# Patient Record
Sex: MALE | Race: ASIAN | NOT HISPANIC OR LATINO
[De-identification: names, ages, dates, MRNs, and addresses within clinical notes are randomized per-mention and may not be internally consistent; named-entity substitution may affect disease eponyms.]

---

## 2017-06-14 ENCOUNTER — APPOINTMENT (OUTPATIENT)
Dept: UROLOGY | Facility: CLINIC | Age: 67
End: 2017-06-14

## 2017-06-20 ENCOUNTER — FORM ENCOUNTER (OUTPATIENT)
Age: 67
End: 2017-06-20

## 2017-06-21 ENCOUNTER — APPOINTMENT (OUTPATIENT)
Dept: ULTRASOUND IMAGING | Facility: IMAGING CENTER | Age: 67
End: 2017-06-21

## 2017-06-21 ENCOUNTER — OUTPATIENT (OUTPATIENT)
Dept: OUTPATIENT SERVICES | Facility: HOSPITAL | Age: 67
LOS: 1 days | End: 2017-06-21
Payer: COMMERCIAL

## 2017-06-21 DIAGNOSIS — N20.0 CALCULUS OF KIDNEY: ICD-10-CM

## 2017-06-21 DIAGNOSIS — Z98.89 OTHER SPECIFIED POSTPROCEDURAL STATES: Chronic | ICD-10-CM

## 2017-06-21 PROCEDURE — 76770 US EXAM ABDO BACK WALL COMP: CPT

## 2017-08-17 ENCOUNTER — APPOINTMENT (OUTPATIENT)
Dept: ORTHOPEDIC SURGERY | Facility: CLINIC | Age: 67
End: 2017-08-17
Payer: MEDICARE

## 2017-08-17 VITALS — HEART RATE: 61 BPM | DIASTOLIC BLOOD PRESSURE: 80 MMHG | SYSTOLIC BLOOD PRESSURE: 125 MMHG

## 2017-08-17 VITALS — WEIGHT: 138 LBS | HEIGHT: 65 IN | BODY MASS INDEX: 22.99 KG/M2

## 2017-08-17 DIAGNOSIS — Z78.9 OTHER SPECIFIED HEALTH STATUS: ICD-10-CM

## 2017-08-17 DIAGNOSIS — Z86.39 PERSONAL HISTORY OF OTHER ENDOCRINE, NUTRITIONAL AND METABOLIC DISEASE: ICD-10-CM

## 2017-08-17 DIAGNOSIS — Z87.442 PERSONAL HISTORY OF URINARY CALCULI: ICD-10-CM

## 2017-08-17 PROCEDURE — 99214 OFFICE O/P EST MOD 30 MIN: CPT

## 2017-08-17 RX ORDER — ATORVASTATIN CALCIUM 80 MG/1
TABLET, FILM COATED ORAL
Refills: 0 | Status: ACTIVE | COMMUNITY

## 2017-08-31 ENCOUNTER — APPOINTMENT (OUTPATIENT)
Dept: ORTHOPEDIC SURGERY | Facility: CLINIC | Age: 67
End: 2017-08-31
Payer: MEDICARE

## 2017-08-31 PROCEDURE — 99214 OFFICE O/P EST MOD 30 MIN: CPT

## 2017-08-31 PROCEDURE — 99213 OFFICE O/P EST LOW 20 MIN: CPT

## 2018-07-22 PROBLEM — Z78.9 USES ALCOHOL OCCASIONALLY: Status: ACTIVE | Noted: 2017-08-17

## 2020-09-02 ENCOUNTER — APPOINTMENT (OUTPATIENT)
Dept: UROLOGY | Facility: CLINIC | Age: 70
End: 2020-09-02
Payer: MEDICARE

## 2020-09-02 VITALS — TEMPERATURE: 97.6 F

## 2020-09-02 VITALS
HEART RATE: 69 BPM | SYSTOLIC BLOOD PRESSURE: 118 MMHG | HEIGHT: 65 IN | RESPIRATION RATE: 16 BRPM | BODY MASS INDEX: 23.32 KG/M2 | WEIGHT: 140 LBS | DIASTOLIC BLOOD PRESSURE: 71 MMHG

## 2020-09-02 DIAGNOSIS — F17.200 NICOTINE DEPENDENCE, UNSPECIFIED, UNCOMPLICATED: ICD-10-CM

## 2020-09-02 LAB
BILIRUB UR QL STRIP: NEGATIVE
CLARITY UR: CLEAR
COLLECTION METHOD: NORMAL
GLUCOSE UR-MCNC: NEGATIVE
HCG UR QL: 0.2 EU/DL
HGB UR QL STRIP.AUTO: NEGATIVE
KETONES UR-MCNC: NEGATIVE
LEUKOCYTE ESTERASE UR QL STRIP: NEGATIVE
NITRITE UR QL STRIP: NEGATIVE
PH UR STRIP: 7
PROT UR STRIP-MCNC: NEGATIVE
SP GR UR STRIP: 1.02

## 2020-09-02 PROCEDURE — 99204 OFFICE O/P NEW MOD 45 MIN: CPT | Mod: 25

## 2020-09-02 PROCEDURE — 51798 US URINE CAPACITY MEASURE: CPT

## 2020-09-02 NOTE — REVIEW OF SYSTEMS
[Negative] : Heme/Lymph [Dry Eyes] : dryness of the eyes [History of kidney stones] : history of kidney stones [Joint Pain] : joint pain

## 2020-09-10 NOTE — HISTORY OF PRESENT ILLNESS
[Nocturia] : nocturia [Weak Stream] : weak stream [FreeTextEntry1] : 69 yr old male presents to re-establish care after 3 years. No follow up for kidney stones since last visit. Pt complaining of "slow urination during the nighttime", reports small voids overnight. Nocturia x 2-3. Pt denies dysuria, hematuria, or abd pain. Last seen > 3 years prior.\par \par HX- left URS for ureteral stone, cystolithalopaxy bladder stone treatment in 2/2016.\par Prior Stone analysis was calcium oxalate monohydrate 100%\par \par Pt notes  pain left lower back. \par \par PSA \par 4.61- 7/22/2020\par \par All pertinent parts of the patient PFSH (past medical, family, and social histories), laboratory, radiological studies and available physician notes were reviewed in chart and with pt. Questionnaire results, where appropriate, were discussed with the patient.\par  [Dysuria] : no dysuria [Hematuria - Gross] : no gross hematuria

## 2020-09-10 NOTE — PHYSICAL EXAM
[Heart Rate And Rhythm] : Heart rate and rhythm were normal [General Appearance - Well Developed] : well developed [] : no respiratory distress [Urethral Meatus] : meatus normal [Abdomen Soft] : soft [Penis Abnormality] : normal uncircumcised penis [Urinary Bladder Findings] : the bladder was normal on palpation [Scrotum] : the scrotum was normal [Rectal Exam - Rectum] : digital rectal exam was normal [Testes Mass (___cm)] : there were no testicular masses [No Prostate Nodules] : no prostate nodules [Prostate Size ___ (0-4)] : prostate size [unfilled] (scale: 0-4) [External Hemorrhoid] : an external hemorrhoid was present [Skin Color & Pigmentation] : normal skin color and pigmentation [No Focal Deficits] : no focal deficits [Oriented To Time, Place, And Person] : oriented to person, place, and time [Tender, Swollen] : that was nontender and non-swollen [FreeTextEntry1] : Left lower pain- paraspinal [No Palpable Adenopathy] : no palpable adenopathy

## 2020-09-10 NOTE — ASSESSMENT
[FreeTextEntry1] : PVR - 0 ml by bladder scan today\par \par Physical Exam - WNL \par \par urine dipstick- WNL\par Recent PSA noted, borderline for male his age.  Will reassess after starting BPH meds to decrease pressure, and plan as needed\par \par plan\par 1) Start Flomax \par 2)  RTC in 2 months with Renal US and repeat PSA\par

## 2020-11-18 ENCOUNTER — OUTPATIENT (OUTPATIENT)
Dept: OUTPATIENT SERVICES | Facility: HOSPITAL | Age: 70
LOS: 1 days | End: 2020-11-18
Payer: COMMERCIAL

## 2020-11-18 ENCOUNTER — APPOINTMENT (OUTPATIENT)
Dept: UROLOGY | Facility: CLINIC | Age: 70
End: 2020-11-18
Payer: MEDICARE

## 2020-11-18 VITALS — TEMPERATURE: 97.8 F

## 2020-11-18 DIAGNOSIS — Z98.89 OTHER SPECIFIED POSTPROCEDURAL STATES: Chronic | ICD-10-CM

## 2020-11-18 DIAGNOSIS — R35.0 FREQUENCY OF MICTURITION: ICD-10-CM

## 2020-11-18 PROCEDURE — 99213 OFFICE O/P EST LOW 20 MIN: CPT | Mod: 25

## 2020-11-18 PROCEDURE — 76775 US EXAM ABDO BACK WALL LIM: CPT | Mod: 26

## 2020-11-18 PROCEDURE — 76775 US EXAM ABDO BACK WALL LIM: CPT

## 2020-11-18 NOTE — HISTORY OF PRESENT ILLNESS
[FreeTextEntry1] : Pt is 71 yo male with h/o stones, BPH sx--now on flomax. Improved overall!  Frequency less including nocturia ~ 2/night), and improved urinary stream.\par \par Recent pSA 4.61 7/2020 on borderline/elevated for 70 yol male--> now on flomax- plan for repeat psa, and for renal US today [None] : no symptoms [Nocturia] : no nocturia [Weak Stream] : no weak stream

## 2020-11-18 NOTE — ASSESSMENT
[FreeTextEntry1] : Renal US today reviewed: shadowing ~ 4 mm left lower pole echogenic/stone, c/w unchanged stone since 2017 renal US. No hydro, no solid renal mass. Echogenic 2-3 mm without shadowing right lower pole, unclear if small stone.\par \par Overall, voiding sx improved with flomax- will cont\par \par 1. check psa today for repeat (was 4.61 9/2020)\par 2. RTC 6 months with renal US

## 2020-11-19 ENCOUNTER — NON-APPOINTMENT (OUTPATIENT)
Age: 70
End: 2020-11-19

## 2020-11-19 LAB — PSA SERPL-MCNC: 4.41 NG/ML

## 2020-11-27 DIAGNOSIS — N40.1 BENIGN PROSTATIC HYPERPLASIA WITH LOWER URINARY TRACT SYMPTOMS: ICD-10-CM

## 2020-11-27 DIAGNOSIS — N20.0 CALCULUS OF KIDNEY: ICD-10-CM

## 2020-12-17 ENCOUNTER — RX RENEWAL (OUTPATIENT)
Age: 70
End: 2020-12-17

## 2021-04-26 ENCOUNTER — RX RENEWAL (OUTPATIENT)
Age: 71
End: 2021-04-26

## 2021-05-19 ENCOUNTER — APPOINTMENT (OUTPATIENT)
Dept: UROLOGY | Facility: CLINIC | Age: 71
End: 2021-05-19
Payer: MEDICARE

## 2021-05-19 ENCOUNTER — OUTPATIENT (OUTPATIENT)
Dept: OUTPATIENT SERVICES | Facility: HOSPITAL | Age: 71
LOS: 1 days | End: 2021-05-19
Payer: COMMERCIAL

## 2021-05-19 DIAGNOSIS — N20.0 CALCULUS OF KIDNEY: ICD-10-CM

## 2021-05-19 DIAGNOSIS — R35.0 FREQUENCY OF MICTURITION: ICD-10-CM

## 2021-05-19 DIAGNOSIS — N21.0 CALCULUS IN BLADDER: ICD-10-CM

## 2021-05-19 DIAGNOSIS — Z98.89 OTHER SPECIFIED POSTPROCEDURAL STATES: Chronic | ICD-10-CM

## 2021-05-19 DIAGNOSIS — N40.1 BENIGN PROSTATIC HYPERPLASIA WITH LOWER URINARY TRACT SYMPTOMS: ICD-10-CM

## 2021-05-19 PROCEDURE — 76775 US EXAM ABDO BACK WALL LIM: CPT | Mod: 26

## 2021-05-19 PROCEDURE — 76775 US EXAM ABDO BACK WALL LIM: CPT

## 2021-05-19 PROCEDURE — 99213 OFFICE O/P EST LOW 20 MIN: CPT | Mod: 25

## 2021-05-19 NOTE — ASSESSMENT
[FreeTextEntry1] : Renal US today reviewed: shadowing ~ ~8 mm left lower pole echogenic/stone, possible cluster, larger than prior which were stable.No hydro, no solid renal mass. Echogenic 2-3 mm without shadowing right lower pole, unchanged likely small stone also right lower. 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

## 2021-05-19 NOTE — HISTORY OF PRESENT ILLNESS
[None] : None [FreeTextEntry1] : Pt is 71 yo male with h/o stones, BPH sx--now on flomax. Improved overall! Frequency less including nocturia ~ 2/night), and improved urinary stream.\par \par  pSA borderline/elevated for 70 yol male--> now on flomax- plan for repeat psa, and for renal US today \par  \par PSA \par 4.41 11/2020\par 4.61 7/2020 \par \par Reports some issues with erections; feels he's unable to achieve well intermittently, but now is 'okay'.\par \par No nocturia.\par \par S/p full exam 9/2020.

## 2021-05-19 NOTE — PHYSICAL EXAM
[General Appearance - Well Developed] : well developed [Skin Color & Pigmentation] : normal skin color and pigmentation [] : no respiratory distress [Edema] : no peripheral edema [Oriented To Time, Place, And Person] : oriented to person, place, and time [Normal Station and Gait] : the gait and station were normal for the patient's age [No Focal Deficits] : no focal deficits

## 2021-05-19 NOTE — LETTER BODY
[Dear  ___] : Dear  [unfilled], [Courtesy Letter:] : I had the pleasure of seeing your patient, [unfilled], in my office today. [Please see my note below.] : Please see my note below. [Consult Closing:] : Thank you very much for allowing me to participate in the care of this patient.  If you have any questions, please do not hesitate to contact me. [Sincerely,] : Sincerely, [FreeTextEntry1] : Pt is 69 yo male with h/o stones, BPH sx--now on flomax. Improved overall! Frequency less including nocturia ~ 2/night), and improved urinary stream.\par \par  pSA borderline/elevated for 70 yol male--> now on flomax- plan for repeat psa, and for renal US today \par  \par PSA \par 4.41 11/2020\par 4.61 7/2020 \par \par Reports some issues with erections; feels he's unable to achieve well intermittently, but now is 'okay'.\par \par No nocturia.\par \par S/p full exam 9/2020.\par \par Renal US today reviewed: shadowing ~ ~8 mm left lower pole echogenic/stone, possible cluster, larger than prior which were stable.No hydro, no solid renal mass. Echogenic 2-3 mm without shadowing right lower pole, unchanged likely small stone also right lower. 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

## 2021-05-30 ENCOUNTER — APPOINTMENT (OUTPATIENT)
Dept: UROLOGY | Facility: CLINIC | Age: 71
End: 2021-05-30
Payer: MEDICARE

## 2021-05-30 LAB — PSA SERPL-MCNC: 5.19 NG/ML

## 2021-05-30 PROCEDURE — 99214 OFFICE O/P EST MOD 30 MIN: CPT | Mod: 95

## 2021-05-30 NOTE — ASSESSMENT
[FreeTextEntry1] : I had long discussion today with patient about the psa, digital exam, and any imaging findings with respect to risks for prostate cancer.  We discussed the utility of prostate MRI as well as some of the new genetic markers in terms of the potential for improving diagnostic accuracy.\par \par With respect to prostate cancer, we also reviewed age and general health with respect to treatment and diagnostic options. We spoke about the risks of screening, as well as benefits.\par \par Options: f/u with psa to continue to trend, consider MRI of prostate.\par \par I recommended f/u psa, but pt and son prefer to obtain MRI and proceed based on that.\par \par He would like to proceed with: \par \par MRI and review\par \par \par \par

## 2021-05-30 NOTE — HISTORY OF PRESENT ILLNESS
[Home] : at home, [unfilled] , at the time of the visit. [Other Location: e.g. Home (Enter Location, City,State)___] : at [unfilled] [Family Member] : family member [Verbal consent obtained from patient] : the patient, [unfilled] [FreeTextEntry4] : Son [FreeTextEntry1] : The patient-doctor relationship has been established in a face to face fashion via real time video/audio HIPAA compliant communication using telemedicine software. The patient's identity has been confirmed. The patient was previously emailed a copy of the telemedicine consent. They have had a chance to review and has now given verbal consent and has requested care to be assessed and treated via telemedicine. They understand there may be limitations in this process, and that they may need further followup care in the office and/or hospital settings.\par \par Verbal consent given on May 30 2021 11:00AM\par \par KATTY MACIAS is a 70 year M who presents for f/u psa level- h/o stones, currently stable after visit 5/19/21\par \par PSA history:\par 5.19--- 5/19/21\par 4.41--- 11/2020\par 4.6---- 7/2020\par \par 05/30/2021 \par \par The patient denies fevers, chills, nausea and/or vomiting, and no unexplained weight loss.\par \par All pertinent parts of the patient PFSH (past medical, family, and social histories), laboratory, radiological studies and available physician notes were reviewed prior to starting the face-to-face portion of the telemedicine visit. Questionnaire results, where appropriate, were discussed with the patient.\par

## 2021-06-17 ENCOUNTER — APPOINTMENT (OUTPATIENT)
Dept: MRI IMAGING | Facility: IMAGING CENTER | Age: 71
End: 2021-06-17
Payer: MEDICARE

## 2021-06-17 ENCOUNTER — RESULT REVIEW (OUTPATIENT)
Age: 71
End: 2021-06-17

## 2021-06-17 ENCOUNTER — OUTPATIENT (OUTPATIENT)
Dept: OUTPATIENT SERVICES | Facility: HOSPITAL | Age: 71
LOS: 1 days | End: 2021-06-17
Payer: COMMERCIAL

## 2021-06-17 DIAGNOSIS — Z98.89 OTHER SPECIFIED POSTPROCEDURAL STATES: Chronic | ICD-10-CM

## 2021-06-17 DIAGNOSIS — R97.20 ELEVATED PROSTATE SPECIFIC ANTIGEN [PSA]: ICD-10-CM

## 2021-06-17 PROCEDURE — A9585: CPT

## 2021-06-17 PROCEDURE — 72197 MRI PELVIS W/O & W/DYE: CPT | Mod: 26

## 2021-06-17 PROCEDURE — 76498 UNLISTED MR PROCEDURE: CPT

## 2021-06-17 PROCEDURE — 72197 MRI PELVIS W/O & W/DYE: CPT

## 2021-06-17 PROCEDURE — 76498 UNLISTED MR PROCEDURE: CPT | Mod: 26

## 2021-06-18 ENCOUNTER — NON-APPOINTMENT (OUTPATIENT)
Age: 71
End: 2021-06-18

## 2021-06-29 ENCOUNTER — APPOINTMENT (OUTPATIENT)
Dept: UROLOGY | Facility: CLINIC | Age: 71
End: 2021-06-29
Payer: MEDICARE

## 2021-06-29 PROCEDURE — 99442: CPT

## 2021-06-29 NOTE — ASSESSMENT
[FreeTextEntry1] : I had long discussion today with patient about the psa, digital exam, and prostate MRI \par \par We discussed implications as to the risk/benefits of prostate biopsy in terms of procedure, jannet-procedure (sepsis, infection, bleeding, retention), and implications/advantages of establishing the diagnosis.\par \par Given age, health, I think he will most benefit from consideration of MR fusion biopsy of prostate.\par \par He would like to proceed with: MR fusion biopsy of prostate.\par \par \par \par

## 2021-06-29 NOTE — HISTORY OF PRESENT ILLNESS
[Home] : at home, [unfilled] , at the time of the visit. [Other Location: e.g. Home (Enter Location, City,State)___] : at [unfilled] [Verbal consent obtained from patient] : the patient, [unfilled] [FreeTextEntry1] : The patient-doctor relationship has been established in a face to face fashion via real time video/audio HIPAA compliant communication using telemedicine software. The patient's identity has been confirmed. The patient was previously emailed a copy of the telemedicine consent. They have had a chance to review and has now given verbal consent and has requested care to be assessed and treated via telemedicine. They understand there may be limitations in this process, and that they may need further followup care in the office and/or hospital settings.\par \par Verbal consent given on Jun 29 2021  1:40PM (telephonic only)\par \par KATTY MACIAS is a 70 year M who presents for f/u rising psa. Now 5.8. (H/o BPH/LUTS and kidney stones as well; previously eval 5/30/21, and 5/19/21)\par \par MRI done showing ~9 mm pirads 4 lesion both periph zones. Prostate size 41 cc, leading to calculated PSAD of 0.14.\par \par 06/29/2021 \par \par The patient denies fevers, chills, nausea and/or vomiting, and no unexplained weight loss.\par \par All pertinent parts of the patient PFSH (past medical, family, and social histories), laboratory, radiological studies and available physician notes were reviewed prior to starting the face-to-face portion of the telemedicine visit. Questionnaire results, where appropriate, were discussed with the patient.\par

## 2021-08-04 ENCOUNTER — NON-APPOINTMENT (OUTPATIENT)
Age: 71
End: 2021-08-04

## 2021-08-12 ENCOUNTER — APPOINTMENT (OUTPATIENT)
Dept: UROLOGY | Facility: CLINIC | Age: 71
End: 2021-08-12
Payer: MEDICARE

## 2021-08-12 ENCOUNTER — OUTPATIENT (OUTPATIENT)
Dept: OUTPATIENT SERVICES | Facility: HOSPITAL | Age: 71
LOS: 1 days | End: 2021-08-12
Payer: COMMERCIAL

## 2021-08-12 VITALS
HEIGHT: 65 IN | HEART RATE: 78 BPM | DIASTOLIC BLOOD PRESSURE: 82 MMHG | BODY MASS INDEX: 23.32 KG/M2 | WEIGHT: 140 LBS | TEMPERATURE: 98 F | SYSTOLIC BLOOD PRESSURE: 126 MMHG | RESPIRATION RATE: 17 BRPM

## 2021-08-12 DIAGNOSIS — Z98.89 OTHER SPECIFIED POSTPROCEDURAL STATES: Chronic | ICD-10-CM

## 2021-08-12 DIAGNOSIS — R35.0 FREQUENCY OF MICTURITION: ICD-10-CM

## 2021-08-12 DIAGNOSIS — R97.20 ELEVATED PROSTATE SPECIFIC ANTIGEN [PSA]: ICD-10-CM

## 2021-08-12 DIAGNOSIS — R93.5 ABNORMAL FINDINGS ON DIAGNOSTIC IMAGING OF OTHER ABDOMINAL REGIONS, INCLUDING RETROPERITONEUM: ICD-10-CM

## 2021-08-12 PROCEDURE — 76872 US TRANSRECTAL: CPT | Mod: 26

## 2021-08-12 PROCEDURE — 76942 ECHO GUIDE FOR BIOPSY: CPT | Mod: 26,59

## 2021-08-12 PROCEDURE — 55700: CPT | Mod: 22

## 2021-08-12 PROCEDURE — 76872 US TRANSRECTAL: CPT

## 2021-08-12 PROCEDURE — 76942 ECHO GUIDE FOR BIOPSY: CPT

## 2021-08-12 PROCEDURE — 55700: CPT

## 2021-08-12 PROCEDURE — 76377 3D RENDER W/INTRP POSTPROCES: CPT | Mod: 26

## 2021-08-22 LAB — CORE LAB BIOPSY: NORMAL

## 2021-08-24 ENCOUNTER — APPOINTMENT (OUTPATIENT)
Dept: UROLOGY | Facility: CLINIC | Age: 71
End: 2021-08-24
Payer: MEDICARE

## 2021-08-24 PROCEDURE — 99215 OFFICE O/P EST HI 40 MIN: CPT

## 2021-09-01 ENCOUNTER — OUTPATIENT (OUTPATIENT)
Dept: OUTPATIENT SERVICES | Facility: HOSPITAL | Age: 71
LOS: 1 days | End: 2021-09-01
Payer: MEDICARE

## 2021-09-01 VITALS
HEART RATE: 63 BPM | DIASTOLIC BLOOD PRESSURE: 85 MMHG | RESPIRATION RATE: 16 BRPM | SYSTOLIC BLOOD PRESSURE: 130 MMHG | TEMPERATURE: 98 F | HEIGHT: 64 IN | WEIGHT: 141.98 LBS | OXYGEN SATURATION: 99 %

## 2021-09-01 DIAGNOSIS — Z98.89 OTHER SPECIFIED POSTPROCEDURAL STATES: Chronic | ICD-10-CM

## 2021-09-01 DIAGNOSIS — R94.31 ABNORMAL ELECTROCARDIOGRAM [ECG] [EKG]: ICD-10-CM

## 2021-09-01 DIAGNOSIS — Z78.9 OTHER SPECIFIED HEALTH STATUS: ICD-10-CM

## 2021-09-01 DIAGNOSIS — C61 MALIGNANT NEOPLASM OF PROSTATE: ICD-10-CM

## 2021-09-01 DIAGNOSIS — Z98.890 OTHER SPECIFIED POSTPROCEDURAL STATES: Chronic | ICD-10-CM

## 2021-09-01 LAB
BLD GP AB SCN SERPL QL: NEGATIVE — SIGNIFICANT CHANGE UP
RH IG SCN BLD-IMP: POSITIVE — SIGNIFICANT CHANGE UP

## 2021-09-01 PROCEDURE — 93010 ELECTROCARDIOGRAM REPORT: CPT

## 2021-09-01 RX ORDER — SODIUM CHLORIDE 9 MG/ML
3 INJECTION INTRAMUSCULAR; INTRAVENOUS; SUBCUTANEOUS EVERY 8 HOURS
Refills: 0 | Status: DISCONTINUED | OUTPATIENT
Start: 2021-09-08 | End: 2021-09-09

## 2021-09-01 NOTE — H&P PST ADULT - NSICDXPASTMEDICALHX_GEN_ALL_CORE_FT
PAST MEDICAL HISTORY:  Calculus of bladder     Hyperlipidemia     Kidney stone      PAST MEDICAL HISTORY:  Calculus of bladder     Hyperlipidemia     Kidney stone     Malignant neoplasm of prostate

## 2021-09-01 NOTE — H&P PST ADULT - NSICDXPASTSURGICALHX_GEN_ALL_CORE_FT
PAST SURGICAL HISTORY:  History of lithotripsy      PAST SURGICAL HISTORY:  H/O prostate biopsy     History of cystoscopy bladder stone removal;  02/2016    History of lithotripsy

## 2021-09-01 NOTE — H&P PST ADULT - HISTORY OF PRESENT ILLNESS
72 y/o [place] male speaks minimal English with PMH: Hyperlipidemia, Calculus of kidney presents to [facility]     for pre op evaluation with hx of intermittent left groin & left flank pain since 01/21/16. Pt was evaluated at [facility] ED, S/P CT scan of abdomen. Pre op diagnosis calculus of kidney, calculus of bladder. Now scheduled for cystoscopy, removal of bladder stone, left ureteroscopy with laser stone removal, left stent, image on 02/26/16.    elevated PSA ~ 3 months. S/P MRI/Biopsy 08/12/2021. Pre op diagnosis:     Speaks Malayalam 71 y/o male speaks minimal English with PMH: Hyperlipidemia presents to PST for pre op evaluation with h/o elevated PSA ~ 3 months. S/P MRI/Biopsy 08/12/2021. Pre op diagnosis: Malignant neoplasm of prostate. Now scheduled for single port robotic radical prostatectomy, pelvic node dissection on 09/08/2021    Speaks Malayalam, interpretation services offered, pt refused. Requested daughter Linu to interpret

## 2021-09-01 NOTE — H&P PST ADULT - PROBLEM SELECTOR PLAN 1
Scheduled for Single port robotic radical prostatectomy, pelvic lymph node dissection on 09/08/2021. Pre op instructions, famotidine, chlorhexidine gluconate soap given and explained. Pt verbalized understanding  Pt's daughter confirmed scheduled appt for Covid test

## 2021-09-01 NOTE — H&P PST ADULT - PROBLEM SELECTOR PLAN 2
Abn. EKG noted during PST. Pt/daughter instructed to see cardiologist for further consultation pre op  Pending cardiology consultation

## 2021-09-02 PROBLEM — C61 MALIGNANT NEOPLASM OF PROSTATE: Chronic | Status: ACTIVE | Noted: 2021-09-01

## 2021-09-02 LAB
CULTURE RESULTS: NO GROWTH — SIGNIFICANT CHANGE UP
SPECIMEN SOURCE: SIGNIFICANT CHANGE UP

## 2021-09-05 ENCOUNTER — APPOINTMENT (OUTPATIENT)
Dept: DISASTER EMERGENCY | Facility: CLINIC | Age: 71
End: 2021-09-05

## 2021-09-05 DIAGNOSIS — Z01.818 ENCOUNTER FOR OTHER PREPROCEDURAL EXAMINATION: ICD-10-CM

## 2021-09-06 PROBLEM — Z01.818 PREOP TESTING: Status: ACTIVE | Noted: 2021-09-06

## 2021-09-07 ENCOUNTER — TRANSCRIPTION ENCOUNTER (OUTPATIENT)
Age: 71
End: 2021-09-07

## 2021-09-07 NOTE — ASU PATIENT PROFILE, ADULT - NSICDXPASTMEDICALHX_GEN_ALL_CORE_FT
What Type Of Note Output Would You Prefer (Optional)?: Standard Output How Severe Is Your Rash?: moderate Is This A New Presentation, Or A Follow-Up?: Rash PAST MEDICAL HISTORY:  Calculus of bladder     Hyperlipidemia     Kidney stone     Malignant neoplasm of prostate

## 2021-09-07 NOTE — ASU PATIENT PROFILE, ADULT - NSICDXPASTSURGICALHX_GEN_ALL_CORE_FT
PAST SURGICAL HISTORY:  H/O prostate biopsy     History of cystoscopy bladder stone removal;  02/2016    History of lithotripsy

## 2021-09-08 ENCOUNTER — INPATIENT (INPATIENT)
Facility: HOSPITAL | Age: 71
LOS: 0 days | Discharge: HOME | End: 2021-09-09
Attending: UROLOGY | Admitting: UROLOGY
Payer: MEDICARE

## 2021-09-08 ENCOUNTER — APPOINTMENT (OUTPATIENT)
Dept: UROLOGY | Facility: HOSPITAL | Age: 71
End: 2021-09-08

## 2021-09-08 ENCOUNTER — RESULT REVIEW (OUTPATIENT)
Age: 71
End: 2021-09-08

## 2021-09-08 VITALS
DIASTOLIC BLOOD PRESSURE: 66 MMHG | HEART RATE: 61 BPM | TEMPERATURE: 98 F | HEIGHT: 64 IN | RESPIRATION RATE: 17 BRPM | WEIGHT: 141.98 LBS | SYSTOLIC BLOOD PRESSURE: 115 MMHG | OXYGEN SATURATION: 98 %

## 2021-09-08 DIAGNOSIS — Z98.890 OTHER SPECIFIED POSTPROCEDURAL STATES: Chronic | ICD-10-CM

## 2021-09-08 DIAGNOSIS — E78.5 HYPERLIPIDEMIA, UNSPECIFIED: ICD-10-CM

## 2021-09-08 DIAGNOSIS — E55.9 VITAMIN D DEFICIENCY, UNSPECIFIED: ICD-10-CM

## 2021-09-08 DIAGNOSIS — Z98.89 OTHER SPECIFIED POSTPROCEDURAL STATES: Chronic | ICD-10-CM

## 2021-09-08 DIAGNOSIS — C61 MALIGNANT NEOPLASM OF PROSTATE: ICD-10-CM

## 2021-09-08 LAB — RH IG SCN BLD-IMP: POSITIVE — SIGNIFICANT CHANGE UP

## 2021-09-08 PROCEDURE — 99223 1ST HOSP IP/OBS HIGH 75: CPT

## 2021-09-08 PROCEDURE — 38571 LAPAROSCOPY LYMPHADENECTOMY: CPT

## 2021-09-08 PROCEDURE — 88307 TISSUE EXAM BY PATHOLOGIST: CPT | Mod: 26

## 2021-09-08 PROCEDURE — 88309 TISSUE EXAM BY PATHOLOGIST: CPT | Mod: 26

## 2021-09-08 PROCEDURE — 55866 LAPS SURG PRST8ECT RPBIC RAD: CPT

## 2021-09-08 RX ORDER — ATROPA BELLADONNA AND OPIUM 16.2; 6 MG/1; MG/1
1 SUPPOSITORY RECTAL ONCE
Refills: 0 | Status: DISCONTINUED | OUTPATIENT
Start: 2021-09-08 | End: 2021-09-08

## 2021-09-08 RX ORDER — ACETAMINOPHEN 500 MG
650 TABLET ORAL EVERY 6 HOURS
Refills: 0 | Status: DISCONTINUED | OUTPATIENT
Start: 2021-09-08 | End: 2021-09-09

## 2021-09-08 RX ORDER — METOCLOPRAMIDE HCL 10 MG
10 TABLET ORAL EVERY 6 HOURS
Refills: 0 | Status: DISCONTINUED | OUTPATIENT
Start: 2021-09-08 | End: 2021-09-09

## 2021-09-08 RX ORDER — INFLUENZA VIRUS VACCINE 15; 15; 15; 15 UG/.5ML; UG/.5ML; UG/.5ML; UG/.5ML
0.5 SUSPENSION INTRAMUSCULAR ONCE
Refills: 0 | Status: DISCONTINUED | OUTPATIENT
Start: 2021-09-08 | End: 2021-09-08

## 2021-09-08 RX ORDER — LIDOCAINE 4 G/100G
1 CREAM TOPICAL
Refills: 0 | Status: DISCONTINUED | OUTPATIENT
Start: 2021-09-08 | End: 2021-09-09

## 2021-09-08 RX ORDER — SENNA PLUS 8.6 MG/1
2 TABLET ORAL AT BEDTIME
Refills: 0 | Status: DISCONTINUED | OUTPATIENT
Start: 2021-09-08 | End: 2021-09-09

## 2021-09-08 RX ORDER — INFLUENZA VIRUS VACCINE 15; 15; 15; 15 UG/.5ML; UG/.5ML; UG/.5ML; UG/.5ML
0.7 SUSPENSION INTRAMUSCULAR ONCE
Refills: 0 | Status: DISCONTINUED | OUTPATIENT
Start: 2021-09-08 | End: 2021-09-09

## 2021-09-08 RX ORDER — KETOROLAC TROMETHAMINE 30 MG/ML
15 SYRINGE (ML) INJECTION EVERY 6 HOURS
Refills: 0 | Status: DISCONTINUED | OUTPATIENT
Start: 2021-09-08 | End: 2021-09-09

## 2021-09-08 RX ORDER — HEPARIN SODIUM 5000 [USP'U]/ML
5000 INJECTION INTRAVENOUS; SUBCUTANEOUS EVERY 8 HOURS
Refills: 0 | Status: DISCONTINUED | OUTPATIENT
Start: 2021-09-08 | End: 2021-09-09

## 2021-09-08 RX ORDER — ROSUVASTATIN CALCIUM 5 MG/1
1 TABLET ORAL
Qty: 0 | Refills: 0 | DISCHARGE

## 2021-09-08 RX ORDER — SODIUM CHLORIDE 9 MG/ML
1000 INJECTION, SOLUTION INTRAVENOUS
Refills: 0 | Status: DISCONTINUED | OUTPATIENT
Start: 2021-09-08 | End: 2021-09-08

## 2021-09-08 RX ORDER — TAMSULOSIN HYDROCHLORIDE 0.4 MG/1
0.4 CAPSULE ORAL AT BEDTIME
Refills: 0 | Status: DISCONTINUED | OUTPATIENT
Start: 2021-09-08 | End: 2021-09-09

## 2021-09-08 RX ORDER — SODIUM CHLORIDE 9 MG/ML
1000 INJECTION, SOLUTION INTRAVENOUS
Refills: 0 | Status: DISCONTINUED | OUTPATIENT
Start: 2021-09-08 | End: 2021-09-09

## 2021-09-08 RX ORDER — ONDANSETRON 8 MG/1
4 TABLET, FILM COATED ORAL ONCE
Refills: 0 | Status: DISCONTINUED | OUTPATIENT
Start: 2021-09-08 | End: 2021-09-08

## 2021-09-08 RX ORDER — ATORVASTATIN CALCIUM 80 MG/1
80 TABLET, FILM COATED ORAL AT BEDTIME
Refills: 0 | Status: DISCONTINUED | OUTPATIENT
Start: 2021-09-08 | End: 2021-09-09

## 2021-09-08 RX ORDER — ERGOCALCIFEROL 1.25 MG/1
1 CAPSULE ORAL
Qty: 0 | Refills: 0 | DISCHARGE

## 2021-09-08 RX ORDER — HYDROMORPHONE HYDROCHLORIDE 2 MG/ML
0.5 INJECTION INTRAMUSCULAR; INTRAVENOUS; SUBCUTANEOUS
Refills: 0 | Status: DISCONTINUED | OUTPATIENT
Start: 2021-09-08 | End: 2021-09-08

## 2021-09-08 RX ADMIN — HEPARIN SODIUM 5000 UNIT(S): 5000 INJECTION INTRAVENOUS; SUBCUTANEOUS at 13:46

## 2021-09-08 RX ADMIN — Medication 15 MILLIGRAM(S): at 12:20

## 2021-09-08 RX ADMIN — TAMSULOSIN HYDROCHLORIDE 0.4 MILLIGRAM(S): 0.4 CAPSULE ORAL at 21:33

## 2021-09-08 RX ADMIN — Medication 15 MILLIGRAM(S): at 12:45

## 2021-09-08 RX ADMIN — ATROPA BELLADONNA AND OPIUM 1 SUPPOSITORY(S): 16.2; 6 SUPPOSITORY RECTAL at 13:30

## 2021-09-08 RX ADMIN — Medication 650 MILLIGRAM(S): at 14:30

## 2021-09-08 RX ADMIN — SENNA PLUS 2 TABLET(S): 8.6 TABLET ORAL at 21:33

## 2021-09-08 RX ADMIN — SODIUM CHLORIDE 3 MILLILITER(S): 9 INJECTION INTRAMUSCULAR; INTRAVENOUS; SUBCUTANEOUS at 22:40

## 2021-09-08 RX ADMIN — LIDOCAINE 1 APPLICATION(S): 4 CREAM TOPICAL at 21:33

## 2021-09-08 RX ADMIN — ATORVASTATIN CALCIUM 80 MILLIGRAM(S): 80 TABLET, FILM COATED ORAL at 21:33

## 2021-09-08 RX ADMIN — Medication 650 MILLIGRAM(S): at 19:07

## 2021-09-08 RX ADMIN — LIDOCAINE 1 APPLICATION(S): 4 CREAM TOPICAL at 18:13

## 2021-09-08 RX ADMIN — HYDROMORPHONE HYDROCHLORIDE 0.5 MILLIGRAM(S): 2 INJECTION INTRAMUSCULAR; INTRAVENOUS; SUBCUTANEOUS at 12:45

## 2021-09-08 RX ADMIN — Medication 650 MILLIGRAM(S): at 13:46

## 2021-09-08 RX ADMIN — HYDROMORPHONE HYDROCHLORIDE 0.5 MILLIGRAM(S): 2 INJECTION INTRAMUSCULAR; INTRAVENOUS; SUBCUTANEOUS at 12:19

## 2021-09-08 RX ADMIN — Medication 15 MILLIGRAM(S): at 18:13

## 2021-09-08 RX ADMIN — SODIUM CHLORIDE 125 MILLILITER(S): 9 INJECTION, SOLUTION INTRAVENOUS at 12:39

## 2021-09-08 RX ADMIN — SODIUM CHLORIDE 3 MILLILITER(S): 9 INJECTION INTRAMUSCULAR; INTRAVENOUS; SUBCUTANEOUS at 14:07

## 2021-09-08 RX ADMIN — ATROPA BELLADONNA AND OPIUM 1 SUPPOSITORY(S): 16.2; 6 SUPPOSITORY RECTAL at 12:44

## 2021-09-08 RX ADMIN — HEPARIN SODIUM 5000 UNIT(S): 5000 INJECTION INTRAVENOUS; SUBCUTANEOUS at 21:33

## 2021-09-08 NOTE — CONSULT NOTE ADULT - PROBLEM SELECTOR RECOMMENDATION 9
09/08/2021 s/p RALP/PLND - post op management per urology, early ambulation, await return of bowel function, pain control, IVFs, IS, DVT ppx (SC hep)

## 2021-09-08 NOTE — ASU PREOP CHECKLIST - DNR CLARIFICATION FORM COMPLETED
see above. - continue with Seroquel 12.5, follow up as outpatient for response with PMD - continue with Seroquel 12.5, follow up as outpatient for response with PMD - no further inpatient workup per plastics and trauma; outpatient follow up -no events on telemetry  -likely mechanical given coroborative symptoms.  -PT, rehab per prior ED and H/P, mechanical fall, however when discussed with daughter, stated unsure whether was seen the actual fall or people came after heard the fall.  -will continue to monitor on telemetry given uncertainty at this time.   -PT to see patient. see above. see above. Mechanical in nature, no further inpatient workup  - PT in Rehab n/a

## 2021-09-08 NOTE — BRIEF OPERATIVE NOTE - NSICDXBRIEFPROCEDURE_GEN_ALL_CORE_FT
PROCEDURES:  Robot-assisted radical prostatectomy 08-Sep-2021 11:54:01  Trinidad Bronson  Robot-assisted pelvic lymph node dissection 08-Sep-2021 11:54:20  Trinidad Bronson

## 2021-09-08 NOTE — CONSULT NOTE ADULT - ASSESSMENT
70M Hyperlipidemia, vitamin D insufficiency, found to have elevated PSA S/P MRI/Biopsy 08/12/2021, found to have Malignant neoplasm of prostate. Now 09/08/2021 s/p RALP/PLND.

## 2021-09-08 NOTE — ASU PREOP CHECKLIST - 2.
Patient gives verbal consent for son Abdirizak Conner to interpret unable to reach [facility]  when tried

## 2021-09-08 NOTE — ASU PREOP CHECKLIST - SIDE RAILS UP
Transitions of Care Status:  1.  Name of PCP:  Homero Melgar  2.  PCP Contacted on Admission: [ ] Y    [ ] N    3.  PCP contacted at Discharge: [ ] Y    [ ] N    [ ] N/A  4.  Post-Discharge Appointment Date and Location:  5.  Summary of Handoff given to PCP: n/a

## 2021-09-08 NOTE — CONSULT NOTE ADULT - SUBJECTIVE AND OBJECTIVE BOX
PMD Dr. Damien Pardo    Patient is a 71y old  Male who presents with a chief complaint of prostate cancer (08 Sep 2021 14:36)    HPI: 70M Hyperlipidemia, vitamin D insufficiency, found to have elevated PSA ~ 3 months. S/P MRI/Biopsy 08/12/2021, found to have Malignant neoplasm of prostate. Now 09/08/2021 s/p RALP/PLND. Doing well, no c/o pain, SOB, nausea.  Speaks Malayalam, daughter interpreted per pt request    Allergies: No Known Allergies    HOME MEDICATIONS:   · 	tamsulosin 0.4 mg oral capsule: 1 cap(s) orally once a day  · 	rosuvastatin 20 mg oral tablet: 1 tab(s) orally once a day  · 	vitamin D: 1000 qd    MEDICATIONS  (STANDING):  acetaminophen   Tablet .. 650 milliGRAM(s) Oral every 6 hours  atorvastatin 80 milliGRAM(s) Oral at bedtime  heparin   Injectable 5000 Unit(s) SubCutaneous every 8 hours  influenza  Vaccine (HIGH DOSE) 0.7 milliLiter(s) IntraMuscular once  ketorolac   Injectable 15 milliGRAM(s) IV Push every 6 hours  lactated ringers. 1000 milliLiter(s) (125 mL/Hr) IV Continuous <Continuous>  lidocaine 5% Ointment 1 Application(s) Topical every 3 hours  senna 2 Tablet(s) Oral at bedtime  sodium chloride 0.9% lock flush 3 milliLiter(s) IV Push every 8 hours  tamsulosin 0.4 milliGRAM(s) Oral at bedtime    MEDICATIONS  (PRN):  metoclopramide Injectable 10 milliGRAM(s) IV Push every 6 hours PRN Nausea and/or Vomiting    PAST MEDICAL & SURGICAL HISTORY:  Kidney stone  Hyperlipidemia  Calculus of bladder  Malignant neoplasm of prostate  History of lithotripsy  History of cystoscopy bladder stone removal;  02/2016  H/O prostate biopsy    SOCIAL HISTORY:  , lives with wife, son, daughter-in-law  +tobacco; "small drink at night", no drink in over one month; no drugs    FAMILY HISTORY:  [x ] No pertinent family history in first degree relatives     REVIEW OF SYSTEMS:  CONSTITUTIONAL: No fever, weight loss, or fatigue  EYES: No eye pain, visual disturbances, or discharge  ENMT:  No difficulty hearing, tinnitus, vertigo; No sinus or throat pain  NECK: No pain or stiffness  BREASTS: No pain, masses, or nipple discharge  RESPIRATORY: No cough, wheezing, chills or hemoptysis; No shortness of breath  CARDIOVASCULAR: No chest pain, palpitations, dizziness, or leg swelling  GASTROINTESTINAL: No abdominal or epigastric pain. No nausea, vomiting, or hematemesis; No diarrhea or constipation. No melena or hematochezia.  GENITOURINARY: per HPI  NEUROLOGICAL: No headaches, memory loss, loss of strength, numbness, or tremors  SKIN: No itching, burning, rashes, or lesions   LYMPH NODES: No enlarged glands  ENDOCRINE: No heat or cold intolerance; No hair loss  MUSCULOSKELETAL: No muscle or back pain  PSYCHIATRIC: No depression, anxiety, mood swings, or difficulty sleeping  HEME/LYMPH: No easy bruising, or bleeding gums  ALLERGY AND IMMUNOLOGIC: No hives or eczema  [  ] All other ROS negative  [  ] Unable to obtain due to poor mental status    Vital Signs Last 24 Hrs  T(C): 36.4 (08 Sep 2021 18:28), Max: 36.6 (08 Sep 2021 06:15)  T(F): 97.6 (08 Sep 2021 18:28), Max: 97.9 (08 Sep 2021 06:15)  HR: 56 (08 Sep 2021 18:28) (56 - 97)  BP: 119/70 (08 Sep 2021 18:28) (115/66 - 145/73)  BP(mean): 86 (08 Sep 2021 15:00) (83 - 102)  RR: 17 (08 Sep 2021 18:28) (12 - 20)  SpO2: 98% (08 Sep 2021 18:28) (94% - 100%)    PHYSICAL EXAM:  GENERAL: NAD, well-groomed, well-developed  HEAD:  Atraumatic, Normocephalic  EYES: EOMI, PERRLA, conjunctiva and sclera clear  ENMT: Moist mucous membranes  NECK: Supple, No JVD  RESPIRATORY: Clear to auscultation bilaterally; No rales, rhonchi, wheezing, or rubs  CARDIOVASCULAR: Regular rate and rhythm; No murmurs, rubs, or gallops  GASTROINTESTINAL: Soft, Nontender, incisions covered, +ZAIDA  GENITOURINARY: +awad  EXTREMITIES:  2+ Peripheral Pulses, No clubbing, cyanosis, or edema  NERVOUS SYSTEM:  Alert & Oriented X3; Moving all 4 extremities; No gross sensory deficits  HEME/LYMPH: No lymphadenopathy noted  SKIN: No rashes or lesions  PSYCH: calm, appropriate    LABS:  labs from 8/2/21 in chart reviewed including  Hb 16, Cr 1.20, PSA 5.49    RADIOLOGY & ADDITIONAL STUDIES:    EKG:   Personally Reviewed:  [ ] YES     Imaging:   Personally Reviewed:  [ ] YES               Consultant(s) notes reviewed:    Care Discussed with Consultant(s)/Other Providers: urology re overall care

## 2021-09-08 NOTE — BRIEF OPERATIVE NOTE - OPERATION/FINDINGS
Patient was prepped and draped appropriately, incisions were made for the single [place] robot and assistant port, trocars were inserted and robot was docked. Prostatectomy was conducted, pelvic lymph node dissection completed, bladder neck anastomosis was completed with no visible leakage. Hemostasis was achieved with minimal blood loss. Incisions were closed and dressed appropriately.

## 2021-09-08 NOTE — PROGRESS NOTE ADULT - SUBJECTIVE AND OBJECTIVE BOX
Note    Post op Check    s/p: RALP, PLND    Pt seen / examined without complaints pain controlled    Vital Signs Last 24 Hrs  T(C): 36.4 (08 Sep 2021 14:15), Max: 36.6 (08 Sep 2021 06:15)  T(F): 97.5 (08 Sep 2021 14:15), Max: 97.9 (08 Sep 2021 06:15)  HR: 70 (08 Sep 2021 14:15) (61 - 97)  BP: 125/74 (08 Sep 2021 14:15) (115/66 - 145/73)  BP(mean): 87 (08 Sep 2021 14:15) (83 - 102)  RR: 20 (08 Sep 2021 14:15) (15 - 20)  SpO2: 99% (08 Sep 2021 14:15) (94% - 100%)    I&O's Summary    08 Sep 2021 07:01  -  08 Sep 2021 14:41  --------------------------------------------------------  IN: 475 mL / OUT: 160 mL / NET: 315 mL    EBL=100  Fol=120  ZAIDA=25    PHYSICAL EXAM:       Constitutional: awake alert NAD    Respiratory: No resp distress    Cardiovascular: RRR    Gastrointestinal: softly distended, trocar site CDI, ZAIDA with serosanguinous    Genitourinary: awad in place draining well light pink    Extremities: + venodynes

## 2021-09-09 ENCOUNTER — TRANSCRIPTION ENCOUNTER (OUTPATIENT)
Age: 71
End: 2021-09-09

## 2021-09-09 VITALS
TEMPERATURE: 98 F | DIASTOLIC BLOOD PRESSURE: 60 MMHG | OXYGEN SATURATION: 96 % | SYSTOLIC BLOOD PRESSURE: 120 MMHG | HEART RATE: 70 BPM | RESPIRATION RATE: 18 BRPM

## 2021-09-09 LAB
ANION GAP SERPL CALC-SCNC: 14 MMOL/L — SIGNIFICANT CHANGE UP (ref 7–14)
BASOPHILS # BLD AUTO: 0.01 K/UL — SIGNIFICANT CHANGE UP (ref 0–0.2)
BASOPHILS NFR BLD AUTO: 0.1 % — SIGNIFICANT CHANGE UP (ref 0–2)
BUN SERPL-MCNC: 23 MG/DL — SIGNIFICANT CHANGE UP (ref 7–23)
CALCIUM SERPL-MCNC: 9.3 MG/DL — SIGNIFICANT CHANGE UP (ref 8.4–10.5)
CHLORIDE SERPL-SCNC: 100 MMOL/L — SIGNIFICANT CHANGE UP (ref 98–107)
CO2 SERPL-SCNC: 21 MMOL/L — LOW (ref 22–31)
COVID-19 SPIKE DOMAIN AB INTERP: POSITIVE
COVID-19 SPIKE DOMAIN ANTIBODY RESULT: >250 U/ML — HIGH
CREAT FLD-MCNC: 1.13 MG/DL — SIGNIFICANT CHANGE UP
CREAT SERPL-MCNC: 1.15 MG/DL — SIGNIFICANT CHANGE UP (ref 0.5–1.3)
EOSINOPHIL # BLD AUTO: 0.01 K/UL — SIGNIFICANT CHANGE UP (ref 0–0.5)
EOSINOPHIL NFR BLD AUTO: 0.1 % — SIGNIFICANT CHANGE UP (ref 0–6)
GLUCOSE SERPL-MCNC: 139 MG/DL — HIGH (ref 70–99)
HCT VFR BLD CALC: 36.9 % — LOW (ref 39–50)
HGB BLD-MCNC: 12.6 G/DL — LOW (ref 13–17)
IANC: 8.44 K/UL — SIGNIFICANT CHANGE UP (ref 1.5–8.5)
IMM GRANULOCYTES NFR BLD AUTO: 0.4 % — SIGNIFICANT CHANGE UP (ref 0–1.5)
LYMPHOCYTES # BLD AUTO: 1.41 K/UL — SIGNIFICANT CHANGE UP (ref 1–3.3)
LYMPHOCYTES # BLD AUTO: 13 % — SIGNIFICANT CHANGE UP (ref 13–44)
MCHC RBC-ENTMCNC: 30 PG — SIGNIFICANT CHANGE UP (ref 27–34)
MCHC RBC-ENTMCNC: 34.1 GM/DL — SIGNIFICANT CHANGE UP (ref 32–36)
MCV RBC AUTO: 87.9 FL — SIGNIFICANT CHANGE UP (ref 80–100)
MONOCYTES # BLD AUTO: 0.96 K/UL — HIGH (ref 0–0.9)
MONOCYTES NFR BLD AUTO: 8.8 % — SIGNIFICANT CHANGE UP (ref 2–14)
NEUTROPHILS # BLD AUTO: 8.44 K/UL — HIGH (ref 1.8–7.4)
NEUTROPHILS NFR BLD AUTO: 77.6 % — HIGH (ref 43–77)
NRBC # BLD: 0 /100 WBCS — SIGNIFICANT CHANGE UP
NRBC # FLD: 0 K/UL — SIGNIFICANT CHANGE UP
PLATELET # BLD AUTO: 184 K/UL — SIGNIFICANT CHANGE UP (ref 150–400)
POTASSIUM SERPL-MCNC: 4.3 MMOL/L — SIGNIFICANT CHANGE UP (ref 3.5–5.3)
POTASSIUM SERPL-SCNC: 4.3 MMOL/L — SIGNIFICANT CHANGE UP (ref 3.5–5.3)
RBC # BLD: 4.2 M/UL — SIGNIFICANT CHANGE UP (ref 4.2–5.8)
RBC # FLD: 13.4 % — SIGNIFICANT CHANGE UP (ref 10.3–14.5)
SARS-COV-2 IGG+IGM SERPL QL IA: >250 U/ML — HIGH
SARS-COV-2 IGG+IGM SERPL QL IA: POSITIVE
SODIUM SERPL-SCNC: 135 MMOL/L — SIGNIFICANT CHANGE UP (ref 135–145)
WBC # BLD: 10.87 K/UL — HIGH (ref 3.8–10.5)
WBC # FLD AUTO: 10.87 K/UL — HIGH (ref 3.8–10.5)

## 2021-09-09 PROCEDURE — 99231 SBSQ HOSP IP/OBS SF/LOW 25: CPT

## 2021-09-09 RX ORDER — OXYCODONE HYDROCHLORIDE 5 MG/1
5 TABLET ORAL EVERY 4 HOURS
Refills: 0 | Status: DISCONTINUED | OUTPATIENT
Start: 2021-09-09 | End: 2021-09-09

## 2021-09-09 RX ORDER — LIDOCAINE 4 G/100G
10 CREAM TOPICAL
Qty: 15 | Refills: 0
Start: 2021-09-09

## 2021-09-09 RX ORDER — POLYETHYLENE GLYCOL 3350 17 G/17G
1 POWDER, FOR SOLUTION ORAL
Qty: 0 | Refills: 0 | DISCHARGE

## 2021-09-09 RX ORDER — HYDROCORTISONE 1 %
1 OINTMENT (GRAM) TOPICAL
Qty: 15 | Refills: 0
Start: 2021-09-09 | End: 2021-09-22

## 2021-09-09 RX ORDER — IBUPROFEN 200 MG
1 TABLET ORAL
Qty: 20 | Refills: 0
Start: 2021-09-09

## 2021-09-09 RX ORDER — SODIUM CHLORIDE 9 MG/ML
1000 INJECTION, SOLUTION INTRAVENOUS
Refills: 0 | Status: DISCONTINUED | OUTPATIENT
Start: 2021-09-09 | End: 2021-09-09

## 2021-09-09 RX ORDER — TAMSULOSIN HYDROCHLORIDE 0.4 MG/1
1 CAPSULE ORAL
Qty: 0 | Refills: 0 | DISCHARGE

## 2021-09-09 RX ORDER — OXYCODONE HYDROCHLORIDE 5 MG/1
1 TABLET ORAL
Qty: 8 | Refills: 0
Start: 2021-09-09

## 2021-09-09 RX ORDER — SENNA PLUS 8.6 MG/1
2 TABLET ORAL
Qty: 0 | Refills: 0 | DISCHARGE
Start: 2021-09-09

## 2021-09-09 RX ORDER — ACETAMINOPHEN 500 MG
2 TABLET ORAL
Qty: 0 | Refills: 0 | DISCHARGE
Start: 2021-09-09

## 2021-09-09 RX ADMIN — Medication 15 MILLIGRAM(S): at 12:59

## 2021-09-09 RX ADMIN — Medication 15 MILLIGRAM(S): at 05:49

## 2021-09-09 RX ADMIN — Medication 650 MILLIGRAM(S): at 13:05

## 2021-09-09 RX ADMIN — Medication 650 MILLIGRAM(S): at 12:59

## 2021-09-09 RX ADMIN — Medication 650 MILLIGRAM(S): at 04:40

## 2021-09-09 RX ADMIN — HEPARIN SODIUM 5000 UNIT(S): 5000 INJECTION INTRAVENOUS; SUBCUTANEOUS at 05:49

## 2021-09-09 RX ADMIN — Medication 15 MILLIGRAM(S): at 00:19

## 2021-09-09 RX ADMIN — Medication 15 MILLIGRAM(S): at 13:05

## 2021-09-09 RX ADMIN — OXYCODONE HYDROCHLORIDE 5 MILLIGRAM(S): 5 TABLET ORAL at 04:40

## 2021-09-09 RX ADMIN — SODIUM CHLORIDE 3 MILLILITER(S): 9 INJECTION INTRAMUSCULAR; INTRAVENOUS; SUBCUTANEOUS at 07:32

## 2021-09-09 RX ADMIN — LIDOCAINE 1 APPLICATION(S): 4 CREAM TOPICAL at 12:58

## 2021-09-09 RX ADMIN — OXYCODONE HYDROCHLORIDE 5 MILLIGRAM(S): 5 TABLET ORAL at 05:20

## 2021-09-09 NOTE — PROGRESS NOTE ADULT - PROBLEM SELECTOR PLAN 1
Strict I&O's  Analgesia Antiemetics  DVT prophylaxis  Incentive spirometry  Clears / OOB  AM labs
09/08/2021 s/p RALP/PLND - post op management per urology, early ambulation, await return of bowel function, pain control, IVFs, IS, DVT ppx (SC hep).

## 2021-09-09 NOTE — DISCHARGE NOTE PROVIDER - NSDCCPCAREPLAN_GEN_ALL_CORE_FT
PRINCIPAL DISCHARGE DIAGNOSIS  Diagnosis: Malignant neoplasm of prostate  Assessment and Plan of Treatment: Empty awad bag as needed as instructed.  Keep well hydrated.  No heavy lifting or straining for 4 to 6 weeks, avoid constipation. You may have intermittent pink tinged urine and small amounts of leakage around awad (due to bladder spasms).  This is normal.   If your urine becomes bright red or with clots, or there is no urine in the bag, please call the office. You may shower, just pat white strips dry, they will fall off in a few weeks. Change dressing at drain site daily or as needed until dry.  Call Dr. Bay's office to schedule a follow up appointment next week for awad removal and further management.  Call the office if you have fever greater than 101, no urine in bag, pain not relieved with pain medication, nausea/vomiting.        SECONDARY DISCHARGE DIAGNOSES  Diagnosis: Hyperlipidemia  Assessment and Plan of Treatment: Continue current home medications and follow up with your primary care provider      Diagnosis: Vitamin D insufficiency  Assessment and Plan of Treatment: Continue current home medications and follow up with your primary care provider

## 2021-09-09 NOTE — DISCHARGE NOTE NURSING/CASE MANAGEMENT/SOCIAL WORK - NSDCPNINST_GEN_ALL_CORE
Call MD with any signs of infection ie. fever/shaking chills, cloudy foul-smelling urine, or with signs of bleeding (urine red like ketchup), or persistent nausea, or with increased unrelieved flank pain. Continue to drink plenty of fluids and avoid straining as well as constipation which may be a side effect from taking narcotic pain meds. Follow-up with MD in office for post-op check as well as with PMD as advised for continuity of care.  Maintain Roblero catheter to gravity drainage leg bag as instructed, remember hand washing and infection prevention measures in order to prevent catheter-related-urinary-tract-infections ("CAUTI")

## 2021-09-09 NOTE — DISCHARGE NOTE NURSING/CASE MANAGEMENT/SOCIAL WORK - PATIENT PORTAL LINK FT
You can access the FollowMyHealth Patient Portal offered by [facility] by registering at the following website: [email or web address]. By joining [facility]’s FollowMyHealth portal, you will also be able to view your health information using other applications (apps) compatible with our system.

## 2021-09-09 NOTE — PROGRESS NOTE ADULT - SUBJECTIVE AND OBJECTIVE BOX
Subjective/24hour Events:   No acute events overnight.     Vital Signs:  Vital Signs Last 24 Hrs  T(C): 36.4 (09 Sep 2021 05:46), Max: 36.9 (08 Sep 2021 21:18)  T(F): 97.6 (09 Sep 2021 05:46), Max: 98.4 (08 Sep 2021 21:18)  HR: 57 (09 Sep 2021 05:46) (53 - 97)  BP: 101/50 (09 Sep 2021 05:46) (101/50 - 145/73)  BP(mean): 86 (08 Sep 2021 15:00) (83 - 102)  RR: 18 (09 Sep 2021 05:46) (12 - 20)  SpO2: 95% (09 Sep 2021 05:46) (94% - 100%)    CAPILLARY BLOOD GLUCOSE          I&O's Detail    08 Sep 2021 07:01  -  09 Sep 2021 06:38  --------------------------------------------------------  IN:    Lactated Ringers: 500 mL    Oral Fluid: 100 mL  Total IN: 600 mL    OUT:    Bulb (mL): 139 mL    Indwelling Catheter - Urethral (mL): 1285 mL  Total OUT: 1424 mL    Total NET: -824 mL          MEDICATIONS  (STANDING):  acetaminophen   Tablet .. 650 milliGRAM(s) Oral every 6 hours  atorvastatin 80 milliGRAM(s) Oral at bedtime  heparin   Injectable 5000 Unit(s) SubCutaneous every 8 hours  influenza  Vaccine (HIGH DOSE) 0.7 milliLiter(s) IntraMuscular once  ketorolac   Injectable 15 milliGRAM(s) IV Push every 6 hours  lactated ringers. 1000 milliLiter(s) (125 mL/Hr) IV Continuous <Continuous>  lidocaine 5% Ointment 1 Application(s) Topical every 3 hours  senna 2 Tablet(s) Oral at bedtime  sodium chloride 0.9% lock flush 3 milliLiter(s) IV Push every 8 hours  tamsulosin 0.4 milliGRAM(s) Oral at bedtime    MEDICATIONS  (PRN):  metoclopramide Injectable 10 milliGRAM(s) IV Push every 6 hours PRN Nausea and/or Vomiting  oxyCODONE    IR 5 milliGRAM(s) Oral every 4 hours PRN Severe Pain (7 - 10)      Physical Exam:  Gen: NAD.  Lungs: Non labored breathing.   Ab: Soft, nontender, nondistended.   Ext: Moves all 4 spontaneously.     Labs:                   Subjective/24hour Events:   No acute events overnight. Patient is doing well. Pain is well controlled. Passed gas, no BM. tolerating clears. Started ambulating this morning. Denies N/V, fevers, chills. He endorses increase in pain when he coughs but otherwise has no complaints this AM.    Vital Signs:  Vital Signs Last 24 Hrs  T(C): 36.4 (09 Sep 2021 05:46), Max: 36.9 (08 Sep 2021 21:18)  T(F): 97.6 (09 Sep 2021 05:46), Max: 98.4 (08 Sep 2021 21:18)  HR: 57 (09 Sep 2021 05:46) (53 - 97)  BP: 101/50 (09 Sep 2021 05:46) (101/50 - 145/73)  BP(mean): 86 (08 Sep 2021 15:00) (83 - 102)  RR: 18 (09 Sep 2021 05:46) (12 - 20)  SpO2: 95% (09 Sep 2021 05:46) (94% - 100%)    CAPILLARY BLOOD GLUCOSE          I&O's Detail    08 Sep 2021 07:01  -  09 Sep 2021 06:38  --------------------------------------------------------  IN:    Lactated Ringers: 500 mL    Oral Fluid: 100 mL  Total IN: 600 mL    OUT:    Bulb (mL): 139 mL    Indwelling Catheter - Urethral (mL): 1285 mL  Total OUT: 1424 mL    Total NET: -824 mL          MEDICATIONS  (STANDING):  acetaminophen   Tablet .. 650 milliGRAM(s) Oral every 6 hours  atorvastatin 80 milliGRAM(s) Oral at bedtime  heparin   Injectable 5000 Unit(s) SubCutaneous every 8 hours  influenza  Vaccine (HIGH DOSE) 0.7 milliLiter(s) IntraMuscular once  ketorolac   Injectable 15 milliGRAM(s) IV Push every 6 hours  lactated ringers. 1000 milliLiter(s) (125 mL/Hr) IV Continuous <Continuous>  lidocaine 5% Ointment 1 Application(s) Topical every 3 hours  senna 2 Tablet(s) Oral at bedtime  sodium chloride 0.9% lock flush 3 milliLiter(s) IV Push every 8 hours  tamsulosin 0.4 milliGRAM(s) Oral at bedtime    MEDICATIONS  (PRN):  metoclopramide Injectable 10 milliGRAM(s) IV Push every 6 hours PRN Nausea and/or Vomiting  oxyCODONE    IR 5 milliGRAM(s) Oral every 4 hours PRN Severe Pain (7 - 10)      Physical Exam:  Gen: NAD.  Lungs: Non labored breathing.   Ab: Soft, nontender, nondistended.   Ext: Moves all 4 spontaneously.     Labs:  Pending

## 2021-09-09 NOTE — DISCHARGE NOTE PROVIDER - NSDCMRMEDTOKEN_GEN_ALL_CORE_FT
acetaminophen 325 mg oral tablet: 3 tablets every 6 hours as needed for mild to moderate pain  hydrocortisone 1% topical cream: Apply a small amount to tip of penis 4 times a day as needed for catheter irritation  ibuprofen 600 mg oral tablet: 1 tablet every 6 hours as needed for moderate pain take with food  lidocaine 2% topical gel with applicator: Apply a small amount to tip of penis 4 times a day as needed for catheter irritation  MiraLax oral powder for reconstitution: 1 packet(s) orally once a day as needed for constipation  oxyCODONE 5 mg oral tablet: 1 tablet every 6 hours as needed for severe pain MDD:4  rosuvastatin 20 mg oral tablet: 1 tab(s) orally once a day  senna oral tablet: 2 tab(s) orally once a day (at bedtime)  vitamin D: 1 tab(s) orally 3 times a week

## 2021-09-09 NOTE — DISCHARGE NOTE NURSING/CASE MANAGEMENT/SOCIAL WORK - NSDPDISTO_GEN_ALL_CORE
Pt  with incisions CDI steri strips to scope sites, VS stable Afebrile. pt with positive bowel sounds remigio po diet. Roblero catheter to SGD leg bag drainage. Pt seen by MD and cleared for dc to home as per safe Dc plan./Home

## 2021-09-09 NOTE — DISCHARGE NOTE PROVIDER - NSDCFUSCHEDAPPT_GEN_ALL_CORE_FT
KATTY MACIAS ; 12/01/2021 ; P Urology [address] [place] [address]  KATTY MACIAS ; 12/01/2021 ; P Urology [address] [place] [address]

## 2021-09-09 NOTE — DISCHARGE NOTE PROVIDER - CARE PROVIDER_API CALL
Mohan Bay)  Urology  [address] [place] [address], [place] [address]  Phone: [phone]  Fax: [phone]  Follow Up Time:

## 2021-09-09 NOTE — DISCHARGE NOTE PROVIDER - HOSPITAL COURSE
70 yo M underwent Single port robotic prostatectomy, LND on 9/8/2021.  Postoperative course uneventful, pain controlled, urine remained acceptable in color, ambulating.  Return of GI function on POD #1, diet advanced without incident.  ZAIDA Cr < serum, ZAIDA d/c and pt d/c with awad to leg bag to f/u with Dr. Bay.

## 2021-09-09 NOTE — DISCHARGE NOTE NURSING/CASE MANAGEMENT/SOCIAL WORK - NSDCPECAREGIVERED_GEN_ALL_CORE
Medline and carenotes for surgical procedure RALP, LND, Roblero, Oxycodone, Flomax, as well as DC Medications and side effects literature for patient reference./Yes

## 2021-09-09 NOTE — PROGRESS NOTE ADULT - ASSESSMENT
Mr. Escalera is a 70 yo M with a history of prostate cancer who is now s/p RALP and PLND (9/8).   9/8- Patient doing well, recovering appropriately, pain is well controlled.  9/9    Plan:    - DVT ppx- SQH  - Diet- CLD  - IVF- 125 LR  - F/u AM CBC, BMP, ZAIDA creatinine  - Continue current pain regimen  - Continue chronic condition management  - Appreciate medicine consult recommendations  - Discharge plan- Likely discharge today, 9/9 pending diet tolerance and ZAIDA creatinine.  Mr. Escalera is a 72 yo M with a history of prostate cancer who is now s/p RALP and PLND (9/8).   9/8- Patient doing well, recovering appropriately, pain is well controlled.  9/9- Tolerating clears, passing gas, Denies N/V, planning to ambulate more this AM.     Plan:    - DVT ppx- SQH  - Diet- advance to Regular diet  - IVF- 125 LR  - F/u AM CBC, BMP, ZAIDA creatinine  - Continue current pain regimen  - Continue chronic condition management  - Appreciate medicine consult recommendations  - Discharge plan- Likely discharge today, 9/9 pending diet tolerance and ZAIDA creatinine.  2% lidocaine

## 2021-09-10 LAB — SURGICAL PATHOLOGY STUDY: SIGNIFICANT CHANGE UP

## 2021-09-13 ENCOUNTER — NON-APPOINTMENT (OUTPATIENT)
Age: 71
End: 2021-09-13

## 2021-09-13 DIAGNOSIS — R10.9 UNSPECIFIED ABDOMINAL PAIN: ICD-10-CM

## 2021-09-16 ENCOUNTER — APPOINTMENT (OUTPATIENT)
Dept: UROLOGY | Facility: CLINIC | Age: 71
End: 2021-09-16
Payer: MEDICARE

## 2021-09-16 VITALS
HEIGHT: 65 IN | WEIGHT: 140 LBS | HEART RATE: 68 BPM | SYSTOLIC BLOOD PRESSURE: 125 MMHG | BODY MASS INDEX: 23.32 KG/M2 | RESPIRATION RATE: 16 BRPM | DIASTOLIC BLOOD PRESSURE: 76 MMHG

## 2021-09-16 PROCEDURE — 99024 POSTOP FOLLOW-UP VISIT: CPT

## 2021-09-24 ENCOUNTER — APPOINTMENT (OUTPATIENT)
Dept: CT IMAGING | Facility: IMAGING CENTER | Age: 71
End: 2021-09-24
Payer: MEDICARE

## 2021-09-24 ENCOUNTER — OUTPATIENT (OUTPATIENT)
Dept: OUTPATIENT SERVICES | Facility: HOSPITAL | Age: 71
LOS: 1 days | End: 2021-09-24
Payer: COMMERCIAL

## 2021-09-24 DIAGNOSIS — Z98.890 OTHER SPECIFIED POSTPROCEDURAL STATES: Chronic | ICD-10-CM

## 2021-09-24 DIAGNOSIS — Z98.89 OTHER SPECIFIED POSTPROCEDURAL STATES: Chronic | ICD-10-CM

## 2021-09-24 DIAGNOSIS — R10.9 UNSPECIFIED ABDOMINAL PAIN: ICD-10-CM

## 2021-09-24 PROCEDURE — 74176 CT ABD & PELVIS W/O CONTRAST: CPT

## 2021-09-24 PROCEDURE — 74176 CT ABD & PELVIS W/O CONTRAST: CPT | Mod: 26

## 2021-10-08 ENCOUNTER — RX RENEWAL (OUTPATIENT)
Age: 71
End: 2021-10-08

## 2021-10-22 LAB — PSA SERPL-MCNC: 0.01 NG/ML

## 2021-10-28 ENCOUNTER — APPOINTMENT (OUTPATIENT)
Dept: UROLOGY | Facility: CLINIC | Age: 71
End: 2021-10-28
Payer: MEDICARE

## 2021-10-28 DIAGNOSIS — N13.8 BENIGN PROSTATIC HYPERPLASIA WITH LOWER URINARY TRACT SYMPMS: ICD-10-CM

## 2021-10-28 DIAGNOSIS — N40.1 BENIGN PROSTATIC HYPERPLASIA WITH LOWER URINARY TRACT SYMPMS: ICD-10-CM

## 2021-10-28 PROCEDURE — 99024 POSTOP FOLLOW-UP VISIT: CPT

## 2021-10-28 RX ORDER — TAMSULOSIN HYDROCHLORIDE 0.4 MG/1
0.4 CAPSULE ORAL
Qty: 30 | Refills: 3 | Status: DISCONTINUED | COMMUNITY
Start: 2020-09-02 | End: 2021-10-28

## 2021-10-28 RX ORDER — DIAZEPAM 5 MG/1
5 TABLET ORAL
Qty: 1 | Refills: 0 | Status: DISCONTINUED | COMMUNITY
Start: 2021-08-10 | End: 2021-10-28

## 2021-12-01 ENCOUNTER — APPOINTMENT (OUTPATIENT)
Dept: UROLOGY | Facility: CLINIC | Age: 71
End: 2021-12-01

## 2021-12-18 NOTE — PROGRESS NOTE ADULT - SUBJECTIVE AND OBJECTIVE BOX
0 [facility] Division of Hospital Medicine  Cate Velásquez MD  Pager (M-F, 8A-5P):  In-house pager [phone]; Long-range pager [phone]  Other Times:  Please page Hospitalist in Charge -  In-house pager [phone]    Patient is a 71y old  Male who presents with a chief complaint of RALP, PLND (09 Sep 2021 06:38)    SUBJECTIVE / OVERNIGHT EVENTS: No problems reported over night. +flatus, tolerating clears, discomfort from surgical sites.  ADDITIONAL REVIEW OF SYSTEMS:    MEDICATIONS  (STANDING):  acetaminophen   Tablet .. 650 milliGRAM(s) Oral every 6 hours  atorvastatin 80 milliGRAM(s) Oral at bedtime  dextrose 5% + sodium chloride 0.45%. 1000 milliLiter(s) (75 mL/Hr) IV Continuous <Continuous>  heparin   Injectable 5000 Unit(s) SubCutaneous every 8 hours  influenza  Vaccine (HIGH DOSE) 0.7 milliLiter(s) IntraMuscular once  ketorolac   Injectable 15 milliGRAM(s) IV Push every 6 hours  lidocaine 5% Ointment 1 Application(s) Topical every 3 hours  senna 2 Tablet(s) Oral at bedtime  sodium chloride 0.9% lock flush 3 milliLiter(s) IV Push every 8 hours  tamsulosin 0.4 milliGRAM(s) Oral at bedtime    MEDICATIONS  (PRN):  metoclopramide Injectable 10 milliGRAM(s) IV Push every 6 hours PRN Nausea and/or Vomiting  oxyCODONE    IR 5 milliGRAM(s) Oral every 4 hours PRN Severe Pain (7 - 10)    I&O's Summary    08 Sep 2021 07:01  -  09 Sep 2021 07:00  --------------------------------------------------------  IN: 600 mL / OUT: 1424 mL / NET: -824 mL    09 Sep 2021 07:01  -  09 Sep 2021 09:56  --------------------------------------------------------  IN: 0 mL / OUT: 400 mL / NET: -400 mL    PHYSICAL EXAM:  Vital Signs Last 24 Hrs  T(C): 36.4 (09 Sep 2021 09:24), Max: 36.9 (08 Sep 2021 21:18)  T(F): 97.5 (09 Sep 2021 09:24), Max: 98.4 (08 Sep 2021 21:18)  HR: 70 (09 Sep 2021 09:24) (53 - 97)  BP: 120/60 (09 Sep 2021 09:24) (101/50 - 145/73)  BP(mean): 86 (08 Sep 2021 15:00) (83 - 102)  RR: 18 (09 Sep 2021 09:24) (12 - 20)  SpO2: 96% (09 Sep 2021 09:24) (94% - 100%)    GENERAL: sitting up on side of bed, NAD  RESPIRATORY: Clear to auscultation bilaterally; No rales, rhonchi, wheezing, or rubs  CARDIOVASCULAR: Regular rate and rhythm; No murmurs, rubs, or gallops  GASTROINTESTINAL: Soft, Nontender, incisions covered, +ZAIDA  GENITOURINARY: +awad  EXTREMITIES:  2+ Peripheral Pulses, No clubbing, cyanosis, or edema  NERVOUS SYSTEM:  Alert & Oriented X3; Moving all 4 extremities; No gross sensory deficits  PSYCH: calm, appropriate    LABS:                        12.6   10.87 )-----------( 184      ( 09 Sep 2021 07:00 )             36.9     09-09    135  |  100  |  23  ----------------------------<  139<H>  4.3   |  21<L>  |  1.15    Ca    9.3      09 Sep 2021 07:00    RADIOLOGY & ADDITIONAL TESTS:  Results Reviewed:   Imaging Personally Reviewed:  Electrocardiogram Personally Reviewed:    COORDINATION OF CARE:  Care Discussed with Consultants/Other Providers [Y/N]: urology re overall care  Prior or Outpatient Records Reviewed [Y/N]:

## 2022-01-31 PROBLEM — R97.20 ELEVATED PSA, LESS THAN 10 NG/ML: Status: RESOLVED | Noted: 2021-05-30 | Resolved: 2022-01-31

## 2022-01-31 PROBLEM — R32 URINARY INCONTINENCE, UNSPECIFIED TYPE: Noted: 2021-09-16

## 2022-01-31 PROBLEM — R93.5 ABNORMAL MRI, PELVIS: Status: RESOLVED | Noted: 2021-08-12 | Resolved: 2022-01-31

## 2022-02-03 ENCOUNTER — APPOINTMENT (OUTPATIENT)
Dept: UROLOGY | Facility: CLINIC | Age: 72
End: 2022-02-03
Payer: COMMERCIAL

## 2022-02-03 VITALS
HEART RATE: 76 BPM | WEIGHT: 141 LBS | DIASTOLIC BLOOD PRESSURE: 82 MMHG | SYSTOLIC BLOOD PRESSURE: 135 MMHG | TEMPERATURE: 98 F | HEIGHT: 65 IN | BODY MASS INDEX: 23.49 KG/M2

## 2022-02-03 DIAGNOSIS — R97.20 ELEVATED PROSTATE, SPECIFIC ANTIGEN [PSA]: ICD-10-CM

## 2022-02-03 DIAGNOSIS — Z87.448 PERSONAL HISTORY OF OTHER DISEASES OF URINARY SYSTEM: ICD-10-CM

## 2022-02-03 DIAGNOSIS — R93.5 ABNORMAL FINDINGS ON DIAGNOSTIC IMAGING OF OTHER ABDOMINAL REGIONS, INCLUDING RETROPERITONEUM: ICD-10-CM

## 2022-02-03 DIAGNOSIS — R32 UNSPECIFIED URINARY INCONTINENCE: ICD-10-CM

## 2022-02-03 PROCEDURE — 99214 OFFICE O/P EST MOD 30 MIN: CPT

## 2022-02-03 RX ORDER — BACITRACIN ZINC 500 [USP'U]/G
500 OINTMENT TOPICAL DAILY
Qty: 28 | Refills: 0 | Status: COMPLETED | COMMUNITY
Start: 2017-03-18 | End: 2022-02-03

## 2022-02-03 RX ORDER — RANITIDINE 75 MG/1
TABLET ORAL
Refills: 0 | Status: COMPLETED | COMMUNITY
End: 2022-02-03

## 2022-02-04 LAB — PSA SERPL-MCNC: 0.03 NG/ML

## 2022-06-07 ENCOUNTER — APPOINTMENT (OUTPATIENT)
Dept: UROLOGY | Facility: CLINIC | Age: 72
End: 2022-06-07
Payer: MEDICARE

## 2022-06-07 VITALS
TEMPERATURE: 98.3 F | HEART RATE: 71 BPM | SYSTOLIC BLOOD PRESSURE: 133 MMHG | RESPIRATION RATE: 17 BRPM | HEIGHT: 65 IN | WEIGHT: 141 LBS | DIASTOLIC BLOOD PRESSURE: 78 MMHG | BODY MASS INDEX: 23.49 KG/M2

## 2022-06-07 PROCEDURE — 99214 OFFICE O/P EST MOD 30 MIN: CPT

## 2022-06-07 RX ORDER — SILDENAFIL 100 MG/1
100 TABLET, FILM COATED ORAL
Qty: 6 | Refills: 11 | Status: ACTIVE | COMMUNITY
Start: 2022-06-07 | End: 1900-01-01

## 2022-06-08 LAB — PSA SERPL-MCNC: 0.03 NG/ML

## 2022-06-08 NOTE — LETTER GREETING
[Dear  ___] : Dear  [unfilled], [Follow-Up] : Your patient, [unfilled] was seen in my office today for follow-up [Please see my note below.] : Please see my note below. [FreeTextEntry2] : Damien Pardo MD\par  [address] [place] [address]\[place] [address]

## 2022-06-08 NOTE — HISTORY OF PRESENT ILLNESS
[FreeTextEntry1] : Dalila Escalera returns to the office today.  He is a 71-year-old man status post radical prostatectomy performed last year.  He returns today for routine follow-up.  His PSA levels postoperatively were initially 0.01 in October 2021 and then 0.03 in February 2022.  Surgical pathology had demonstrated Jennifer 3+4 adenocarcinoma.  pT3 aN0 MX.  Negative surgical margins.  He has recovered his urinary control quite well.  He denies any residual leakage.  He states that he is able to achieve erections during the day but seems to have more difficulty at night.  Its unclear if this is related to fatigue or other factors.

## 2022-06-08 NOTE — DISEASE MANAGEMENT
[1] : T1 [c] : c [0-10] : 0 -10 ng/mL [Biopsy with Fusion] : Patient had a biopsy with fusion on [6] : Fusion Biopsy Jennifer Score: 6 [Biopsy results sent to PCP/Referring Physician] : Biopsy results sent to PCP/Referring Physician [] : Patient had a Prostate MRI [4] : 4 [BiopsyDate] : 08/21 [MeasuredProstateVolume] : 41 [TotalCores] : 16 [MaxCoreInvolvement] : 75 [IIB] : IIB [Radical Prostatectomy] : Radical Prostatectomy [Patient had a radical prostatectomy] : Patient had a radical prostatectomy  [7(3+4)] : Jennifer Score 7(3+4) [Negative] : Negative margins [3] : T3 [a] : a [0] : N0 [X] : MX [RadicalProstatectomyDate] : 09/21 [TotalNumberofnodesresected] : 14 [PositiveNodes] : 0

## 2022-07-07 NOTE — H&P PST ADULT - PSYCHIATRIC
PAST MEDICAL HISTORY:  Alcohol dependence     Blindness of right eye     Glaucoma     HTN (hypertension)      negative Affect and characteristics of appearance, verbalizations, behaviors are appropriate

## 2022-07-08 ENCOUNTER — APPOINTMENT (OUTPATIENT)
Dept: ORTHOPEDIC SURGERY | Facility: CLINIC | Age: 72
End: 2022-07-08

## 2022-07-08 VITALS
HEART RATE: 79 BPM | HEIGHT: 65 IN | WEIGHT: 141 LBS | BODY MASS INDEX: 23.49 KG/M2 | DIASTOLIC BLOOD PRESSURE: 81 MMHG | SYSTOLIC BLOOD PRESSURE: 131 MMHG

## 2022-07-08 DIAGNOSIS — M54.16 RADICULOPATHY, LUMBAR REGION: ICD-10-CM

## 2022-07-08 DIAGNOSIS — M70.62 TROCHANTERIC BURSITIS, LEFT HIP: ICD-10-CM

## 2022-07-08 PROCEDURE — 99204 OFFICE O/P NEW MOD 45 MIN: CPT | Mod: 25

## 2022-07-08 PROCEDURE — 73502 X-RAY EXAM HIP UNI 2-3 VIEWS: CPT | Mod: LT

## 2022-07-08 PROCEDURE — 72100 X-RAY EXAM L-S SPINE 2/3 VWS: CPT

## 2022-07-08 PROCEDURE — 20610 DRAIN/INJ JOINT/BURSA W/O US: CPT | Mod: LT

## 2022-07-12 PROBLEM — M70.62 GREATER TROCHANTERIC BURSITIS OF LEFT HIP: Status: ACTIVE | Noted: 2022-07-08

## 2022-07-12 PROBLEM — M54.16 LUMBAR RADICULOPATHY: Status: ACTIVE | Noted: 2017-08-17

## 2022-08-04 ENCOUNTER — APPOINTMENT (OUTPATIENT)
Dept: ORTHOPEDIC SURGERY | Facility: CLINIC | Age: 72
End: 2022-08-04

## 2022-08-04 VITALS — HEIGHT: 65 IN | BODY MASS INDEX: 23.49 KG/M2 | WEIGHT: 141 LBS

## 2022-08-04 PROCEDURE — 99204 OFFICE O/P NEW MOD 45 MIN: CPT

## 2022-08-04 PROCEDURE — 72100 X-RAY EXAM L-S SPINE 2/3 VWS: CPT

## 2022-08-18 ENCOUNTER — OUTPATIENT (OUTPATIENT)
Dept: OUTPATIENT SERVICES | Facility: HOSPITAL | Age: 72
LOS: 1 days | End: 2022-08-18
Payer: COMMERCIAL

## 2022-08-18 ENCOUNTER — APPOINTMENT (OUTPATIENT)
Dept: MRI IMAGING | Facility: CLINIC | Age: 72
End: 2022-08-18

## 2022-08-18 DIAGNOSIS — Z98.890 OTHER SPECIFIED POSTPROCEDURAL STATES: Chronic | ICD-10-CM

## 2022-08-18 DIAGNOSIS — M51.36 OTHER INTERVERTEBRAL DISC DEGENERATION, LUMBAR REGION: ICD-10-CM

## 2022-08-18 DIAGNOSIS — Z98.89 OTHER SPECIFIED POSTPROCEDURAL STATES: Chronic | ICD-10-CM

## 2022-08-18 DIAGNOSIS — Z00.8 ENCOUNTER FOR OTHER GENERAL EXAMINATION: ICD-10-CM

## 2022-08-18 PROCEDURE — 72148 MRI LUMBAR SPINE W/O DYE: CPT

## 2022-08-18 PROCEDURE — 72148 MRI LUMBAR SPINE W/O DYE: CPT | Mod: 26

## 2022-10-10 ENCOUNTER — APPOINTMENT (OUTPATIENT)
Dept: ORTHOPEDIC SURGERY | Facility: CLINIC | Age: 72
End: 2022-10-10

## 2022-10-11 ENCOUNTER — APPOINTMENT (OUTPATIENT)
Dept: ORTHOPEDIC SURGERY | Facility: CLINIC | Age: 72
End: 2022-10-11

## 2022-10-11 PROCEDURE — 99442: CPT

## 2022-10-14 ENCOUNTER — APPOINTMENT (OUTPATIENT)
Dept: ORTHOPEDIC SURGERY | Facility: CLINIC | Age: 72
End: 2022-10-14

## 2022-10-14 VITALS — HEIGHT: 65 IN | WEIGHT: 141 LBS | BODY MASS INDEX: 23.49 KG/M2

## 2022-10-14 DIAGNOSIS — M51.26 OTHER INTERVERTEBRAL DISC DISPLACEMENT, LUMBAR REGION: ICD-10-CM

## 2022-10-14 DIAGNOSIS — M51.36 OTHER INTERVERTEBRAL DISC DEGENERATION, LUMBAR REGION: ICD-10-CM

## 2022-10-14 PROCEDURE — 99215 OFFICE O/P EST HI 40 MIN: CPT

## 2022-10-14 PROCEDURE — 99214 OFFICE O/P EST MOD 30 MIN: CPT

## 2022-10-14 RX ORDER — MELOXICAM 15 MG/1
15 TABLET ORAL
Qty: 30 | Refills: 1 | Status: ACTIVE | COMMUNITY
Start: 2022-08-04 | End: 1900-01-01

## 2022-10-27 ENCOUNTER — APPOINTMENT (OUTPATIENT)
Dept: UROLOGY | Facility: CLINIC | Age: 72
End: 2022-10-27

## 2022-10-27 VITALS
SYSTOLIC BLOOD PRESSURE: 135 MMHG | TEMPERATURE: 98 F | BODY MASS INDEX: 22.49 KG/M2 | HEART RATE: 71 BPM | DIASTOLIC BLOOD PRESSURE: 80 MMHG | WEIGHT: 135 LBS | HEIGHT: 65 IN

## 2022-10-27 PROCEDURE — 99214 OFFICE O/P EST MOD 30 MIN: CPT

## 2022-10-27 RX ORDER — ROSUVASTATIN CALCIUM 20 MG/1
20 TABLET, FILM COATED ORAL
Qty: 90 | Refills: 0 | Status: ACTIVE | COMMUNITY
Start: 2022-07-20

## 2022-10-27 RX ORDER — METHOCARBAMOL 500 MG/1
500 TABLET, FILM COATED ORAL
Qty: 30 | Refills: 0 | Status: ACTIVE | COMMUNITY
Start: 2022-05-17

## 2022-10-27 RX ORDER — LATANOPROST/PF 0.005 %
0.01 DROPS OPHTHALMIC (EYE)
Qty: 8 | Refills: 0 | Status: ACTIVE | COMMUNITY
Start: 2022-10-06

## 2022-10-27 RX ORDER — CYCLOBENZAPRINE HYDROCHLORIDE 10 MG/1
10 TABLET, FILM COATED ORAL
Qty: 30 | Refills: 0 | Status: ACTIVE | COMMUNITY
Start: 2022-06-14

## 2022-10-27 RX ORDER — DICLOFENAC SODIUM 50 MG/1
50 TABLET, DELAYED RELEASE ORAL
Qty: 40 | Refills: 0 | Status: DISCONTINUED | COMMUNITY
Start: 2022-06-23

## 2022-10-28 NOTE — LETTER GREETING
[Dear  ___] : Dear  [unfilled], [Follow-Up] : Your patient, [unfilled] was seen in my office today for follow-up [Please see my note below.] : Please see my note below. [FreeTextEntry2] : Damien Pardo MD\par  [address] [place] [address]\[place] [address]

## 2022-10-28 NOTE — HISTORY OF PRESENT ILLNESS
[FreeTextEntry1] : Dalila Raygoza returns to the office today.  He is a 72-year-old man with history of prostate cancer status post radical prostatectomy.  Surgery was performed just over 1 year ago in September 2021.  The patient had recently undergone abdominal imaging which had shown the presence of a fluid collection adjacent to the bladder.  This was thought to be a lymphocele.  We had discussed the potential for drainage but because the patient was asymptomatic, we elected to monitor it.  He remains asymptomatic at this time without any pelvic pain or discomfort.  He denies fevers or chills.  No change in appetite or bowel movements.  No nausea or vomiting.\par \par Urinary function: Remains dry, Stream is good. Minimal stress incontinence with coughing.  He does not use any pads and has not used them now for at least 6 months.\par \par Erectile function; No erections at this time. Was given Sildenafil but never picked up. Not sexually active at this time

## 2022-10-28 NOTE — DISEASE MANAGEMENT
[1] : T1 [c] : c [0-10] : 0 -10 ng/mL [Biopsy with Fusion] : Patient had a biopsy with fusion on [6] : Fusion Biopsy Jennifer Score: 6 [Biopsy results sent to PCP/Referring Physician] : Biopsy results sent to PCP/Referring Physician [4] : 4 [] : Patient had a Prostate MRI [IIB] : IIB [Patient had a radical prostatectomy] : Patient had a radical prostatectomy  [Radical Prostatectomy] : Radical Prostatectomy [7(3+4)] : Jennifer Score 7(3+4) [Negative] : Negative margins [3] : T3 [a] : a [0] : N0 [X] : MX [BiopsyDate] : 08/21 [MeasuredProstateVolume] : 41 [TotalCores] : 16 [MaxCoreInvolvement] : 75 [RadicalProstatectomyDate] : 09/21 [TotalNumberofnodesresected] : 14 [PositiveNodes] : 0

## 2022-10-31 LAB — PSA SERPL-MCNC: 0.05 NG/ML

## 2023-05-04 ENCOUNTER — APPOINTMENT (OUTPATIENT)
Dept: UROLOGY | Facility: CLINIC | Age: 73
End: 2023-05-04

## 2023-05-30 ENCOUNTER — APPOINTMENT (OUTPATIENT)
Dept: UROLOGY | Facility: CLINIC | Age: 73
End: 2023-05-30
Payer: MEDICARE

## 2023-05-30 PROCEDURE — 99214 OFFICE O/P EST MOD 30 MIN: CPT

## 2023-05-30 NOTE — DISEASE MANAGEMENT
[1] : T1 [c] : c [0-10] : 0 -10 ng/mL [Biopsy with Fusion] : Patient had a biopsy with fusion on [6] : Fusion Biopsy Jennifer Score: 6 [Biopsy results sent to PCP/Referring Physician] : Biopsy results sent to PCP/Referring Physician [] : Patient had a Prostate MRI [4] : 4 [IIB] : IIB [Radical Prostatectomy] : Radical Prostatectomy [Patient had a radical prostatectomy] : Patient had a radical prostatectomy  [7(3+4)] : Jennifer Score 7(3+4) [Negative] : Negative margins [3] : T3 [a] : a [0] : N0 [X] : MX [BiopsyDate] : 08/21 [MeasuredProstateVolume] : 41 [TotalCores] : 16 [MaxCoreInvolvement] : 75 [RadicalProstatectomyDate] : 09/21 [TotalNumberofnodesresected] : 14 [PositiveNodes] : 0

## 2023-05-30 NOTE — LETTER GREETING
[Dear  ___] : Dear  [unfilled], [Follow-Up] : Your patient, [unfilled] was seen in my office today for follow-up [Please see my note below.] : Please see my note below. [FreeTextEntry2] : Damien Pardo MD\par  [address] [place] [address]\[place] [address]

## 2023-05-30 NOTE — HISTORY OF PRESENT ILLNESS
[FreeTextEntry1] : Dalila Escalera returns to the office today.  He is a 72-year-old man with history of prostate cancer.  He has undergone a radical prostatectomy in September 2021.  Postoperatively he had been having some intermittent pelvic discomfort and been found to have a fluid collection in the pelvis, most likely a lymphocele.  We have previously discussed drainage but because he had become asymptomatic we elected against it and simply to monitor it.  He is back today for additional follow-up on this, on his prostate cancer, as well as erectile dysfunction.\par \par At his last check in with me in October 2022 he was dry from a urinary perspective without any incontinence or use of pads.  He had been offered sildenafil for management of erectile dysfunction but because he had not been sexually active or planned to be sexually active, he did not pursue medication.\par \par PSA 0.05 ng/mL Oct 2022.\par \par

## 2023-05-31 LAB — PSA SERPL-MCNC: 0.06 NG/ML

## 2023-11-20 PROBLEM — N39.3 MALE STRESS INCONTINENCE: Status: RESOLVED | Noted: 2021-09-01 | Resolved: 2022-06-08

## 2023-11-21 ENCOUNTER — APPOINTMENT (OUTPATIENT)
Dept: UROLOGY | Facility: CLINIC | Age: 73
End: 2023-11-21
Payer: MEDICARE

## 2023-11-21 VITALS
HEIGHT: 65 IN | DIASTOLIC BLOOD PRESSURE: 79 MMHG | OXYGEN SATURATION: 96 % | HEART RATE: 72 BPM | TEMPERATURE: 98 F | SYSTOLIC BLOOD PRESSURE: 135 MMHG | BODY MASS INDEX: 24.24 KG/M2 | WEIGHT: 145.5 LBS

## 2023-11-21 DIAGNOSIS — N39.3 STRESS INCONTINENCE (FEMALE) (MALE): ICD-10-CM

## 2023-11-21 PROCEDURE — 99214 OFFICE O/P EST MOD 30 MIN: CPT

## 2023-11-22 LAB — PSA SERPL-MCNC: 0.1 NG/ML

## 2024-05-20 PROBLEM — N52.31 ERECTILE DYSFUNCTION AFTER RADICAL PROSTATECTOMY: Status: ACTIVE | Noted: 2022-06-07

## 2024-05-20 PROBLEM — C61 ADENOCARCINOMA OF PROSTATE: Status: ACTIVE | Noted: 2021-08-24

## 2024-05-23 ENCOUNTER — APPOINTMENT (OUTPATIENT)
Dept: UROLOGY | Facility: CLINIC | Age: 74
End: 2024-05-23

## 2024-05-23 DIAGNOSIS — N52.31 ERECTILE DYSFUNCTION FOLLOWING RADICAL PROSTATECTOMY: ICD-10-CM

## 2024-05-23 DIAGNOSIS — C61 MALIGNANT NEOPLASM OF PROSTATE: ICD-10-CM
